# Patient Record
Sex: FEMALE | Race: WHITE | NOT HISPANIC OR LATINO | URBAN - METROPOLITAN AREA
[De-identification: names, ages, dates, MRNs, and addresses within clinical notes are randomized per-mention and may not be internally consistent; named-entity substitution may affect disease eponyms.]

---

## 2025-02-11 ENCOUNTER — APPOINTMENT (OUTPATIENT)
Dept: URBAN - METROPOLITAN AREA CLINIC 41 | Facility: CLINIC | Age: 65
Setting detail: DERMATOLOGY
End: 2025-02-11

## 2025-02-11 DIAGNOSIS — Z71.89 OTHER SPECIFIED COUNSELING: ICD-10-CM

## 2025-02-11 DIAGNOSIS — L82.0 INFLAMED SEBORRHEIC KERATOSIS: ICD-10-CM | Status: STABLE

## 2025-02-11 PROCEDURE — ? COUNSELING

## 2025-02-11 PROCEDURE — 99213 OFFICE O/P EST LOW 20 MIN: CPT

## 2025-02-11 ASSESSMENT — LOCATION ZONE DERM: LOCATION ZONE: TRUNK

## 2025-02-11 ASSESSMENT — LOCATION DETAILED DESCRIPTION DERM: LOCATION DETAILED: RIGHT SUPERIOR UPPER BACK

## 2025-02-11 ASSESSMENT — LOCATION SIMPLE DESCRIPTION DERM: LOCATION SIMPLE: RIGHT UPPER BACK

## 2025-02-11 NOTE — PROCEDURE: COUNSELING
Detail Level: Simple
Detail Level: Generalized
Sunscreen Recommendations: Discussed importance of photo protection with mineral based sunscreen and photo protective clothing
Skin Checks Recommendations: Discussed using a Retinols and benefits